# Patient Record
Sex: FEMALE | Race: WHITE | NOT HISPANIC OR LATINO | Employment: OTHER | URBAN - NONMETROPOLITAN AREA
[De-identification: names, ages, dates, MRNs, and addresses within clinical notes are randomized per-mention and may not be internally consistent; named-entity substitution may affect disease eponyms.]

---

## 2017-10-26 ENCOUNTER — DOCTOR'S OFFICE (OUTPATIENT)
Dept: URBAN - NONMETROPOLITAN AREA CLINIC 1 | Facility: CLINIC | Age: 77
Setting detail: OPHTHALMOLOGY
End: 2017-10-26
Payer: COMMERCIAL

## 2017-10-26 DIAGNOSIS — S05.11XA: ICD-10-CM

## 2017-10-26 DIAGNOSIS — H25.13: ICD-10-CM

## 2017-10-26 DIAGNOSIS — H04.123: ICD-10-CM

## 2017-10-26 DIAGNOSIS — H43.813: ICD-10-CM

## 2017-10-26 DIAGNOSIS — H43.811: ICD-10-CM

## 2017-10-26 DIAGNOSIS — H43.812: ICD-10-CM

## 2017-10-26 DIAGNOSIS — S05.12XA: ICD-10-CM

## 2017-10-26 PROCEDURE — 92014 COMPRE OPH EXAM EST PT 1/>: CPT | Performed by: OPHTHALMOLOGY

## 2017-10-26 PROCEDURE — 92225 OPHTHALMOSCOPY EXTENDED INITIAL: CPT | Performed by: OPHTHALMOLOGY

## 2017-10-26 ASSESSMENT — REFRACTION_MANIFEST
OD_VA3: 20/
OS_AXIS: 155
OD_AXIS: 165
OS_SPHERE: +0.25
OD_CYLINDER: -0.25
OD_VA1: 20/
OD_VA1: 20/
OU_VA: 20/
OS_VA1: 20/
OS_VA3: 20/
OD_SPHERE: +0.25
OD_VA2: 20/
OS_VA1: 20/25-2
OU_VA: 20/
OS_VA3: 20/
OS_ADD: +2.50
OD_VA3: 20/
OU_VA: 20/
OD_VA2: 20/
OS_VA3: 20/
OS_VA2: 20/25-2
OD_VA1: 20/25-2
OD_ADD: +2.50
OS_VA1: 20/
OS_CYLINDER: -0.75
OD_VA3: 20/
OS_VA2: 20/
OS_VA2: 20/
OD_VA2: 20/25-2

## 2017-10-26 ASSESSMENT — SPHEQUIV_DERIVED
OS_SPHEQUIV: -0.125
OD_SPHEQUIV: 0.125
OD_SPHEQUIV: 0.75
OS_SPHEQUIV: 0.25

## 2017-10-26 ASSESSMENT — CONFRONTATIONAL VISUAL FIELD TEST (CVF)
OD_FINDINGS: FULL
OS_FINDINGS: FULL

## 2017-10-26 ASSESSMENT — REFRACTION_CURRENTRX
OS_OVR_VA: 20/
OD_CYLINDER: -0.50
OD_SPHERE: 0.00
OS_OVR_VA: 20/
OD_OVR_VA: 20/
OS_AXIS: 155
OD_OVR_VA: 20/
OS_VPRISM_DIRECTION: PROGS
OS_OVR_VA: 20/
OD_ADD: +2.50
OD_AXIS: 170
OS_SPHERE: +0.25
OS_ADD: +2.50
OD_OVR_VA: 20/
OS_CYLINDER: -0.75
OD_VPRISM_DIRECTION: PROGS

## 2017-10-26 ASSESSMENT — REFRACTION_AUTOREFRACTION
OS_CYLINDER: -0.50
OD_AXIS: 163
OD_SPHERE: +1.00
OS_SPHERE: +0.50
OD_CYLINDER: -0.50
OS_AXIS: 155

## 2017-10-26 ASSESSMENT — SUPERFICIAL PUNCTATE KERATITIS (SPK)
OS_SPK: 1+
OD_SPK: 1+

## 2017-10-26 ASSESSMENT — VISUAL ACUITY
OS_BCVA: 20/100-1
OD_BCVA: 20/40

## 2018-01-29 ENCOUNTER — DOCTOR'S OFFICE (OUTPATIENT)
Dept: URBAN - NONMETROPOLITAN AREA CLINIC 1 | Facility: CLINIC | Age: 78
Setting detail: OPHTHALMOLOGY
End: 2018-01-29
Payer: COMMERCIAL

## 2018-01-29 DIAGNOSIS — H04.123: ICD-10-CM

## 2018-01-29 DIAGNOSIS — H43.812: ICD-10-CM

## 2018-01-29 DIAGNOSIS — H43.811: ICD-10-CM

## 2018-01-29 DIAGNOSIS — H25.13: ICD-10-CM

## 2018-01-29 DIAGNOSIS — H43.813: ICD-10-CM

## 2018-01-29 DIAGNOSIS — H53.123: ICD-10-CM

## 2018-01-29 PROCEDURE — 92083 EXTENDED VISUAL FIELD XM: CPT | Performed by: OPHTHALMOLOGY

## 2018-01-29 PROCEDURE — 92226 OPHTHALMOSCOPY EXT SUBSEQUENT: CPT | Performed by: OPHTHALMOLOGY

## 2018-01-29 PROCEDURE — 92014 COMPRE OPH EXAM EST PT 1/>: CPT | Performed by: OPHTHALMOLOGY

## 2018-01-29 PROCEDURE — 92134 CPTRZ OPH DX IMG PST SGM RTA: CPT | Performed by: OPHTHALMOLOGY

## 2018-01-29 ASSESSMENT — SPHEQUIV_DERIVED
OD_SPHEQUIV: 0.75
OS_SPHEQUIV: 0.25
OD_SPHEQUIV: 0.125
OS_SPHEQUIV: -0.125

## 2018-01-29 ASSESSMENT — REFRACTION_MANIFEST
OD_VA3: 20/
OS_VA1: 20/
OD_VA2: 20/25-2
OS_VA2: 20/
OU_VA: 20/
OD_CYLINDER: -0.25
OD_VA1: 20/
OU_VA: 20/
OS_VA3: 20/
OS_ADD: +2.50
OS_AXIS: 155
OD_VA1: 20/
OS_VA2: 20/25-2
OD_VA2: 20/
OD_VA2: 20/
OS_VA3: 20/
OD_AXIS: 165
OD_ADD: +2.50
OD_VA3: 20/
OD_VA3: 20/
OS_CYLINDER: -0.75
OS_VA1: 20/
OS_VA1: 20/25-2
OS_VA2: 20/
OU_VA: 20/
OS_SPHERE: +0.25
OD_VA1: 20/25-2
OD_SPHERE: +0.25
OS_VA3: 20/

## 2018-01-29 ASSESSMENT — REFRACTION_CURRENTRX
OS_OVR_VA: 20/
OD_ADD: +2.50
OD_VPRISM_DIRECTION: PROGS
OD_OVR_VA: 20/
OS_AXIS: 155
OS_SPHERE: +0.25
OD_OVR_VA: 20/
OS_OVR_VA: 20/
OD_SPHERE: 0.00
OD_OVR_VA: 20/
OD_AXIS: 170
OS_ADD: +2.50
OS_OVR_VA: 20/
OS_CYLINDER: -0.75
OD_CYLINDER: -0.50
OS_VPRISM_DIRECTION: PROGS

## 2018-01-29 ASSESSMENT — VISUAL ACUITY
OS_BCVA: 20/150+1
OD_BCVA: 20/40-1

## 2018-01-29 ASSESSMENT — CONFRONTATIONAL VISUAL FIELD TEST (CVF)
OS_FINDINGS: FULL
OD_FINDINGS: FULL

## 2018-01-29 ASSESSMENT — REFRACTION_AUTOREFRACTION
OS_SPHERE: +0.50
OS_CYLINDER: -0.50
OS_AXIS: 155
OD_SPHERE: +1.00
OD_AXIS: 163
OD_CYLINDER: -0.50

## 2018-01-29 ASSESSMENT — SUPERFICIAL PUNCTATE KERATITIS (SPK)
OD_SPK: 1+
OS_SPK: 1+

## 2018-02-28 ENCOUNTER — DOCTOR'S OFFICE (OUTPATIENT)
Dept: URBAN - NONMETROPOLITAN AREA CLINIC 1 | Facility: CLINIC | Age: 78
Setting detail: OPHTHALMOLOGY
End: 2018-02-28
Payer: COMMERCIAL

## 2018-02-28 DIAGNOSIS — H52.223: ICD-10-CM

## 2018-02-28 DIAGNOSIS — H52.4: ICD-10-CM

## 2018-02-28 PROCEDURE — 92015 DETERMINE REFRACTIVE STATE: CPT | Performed by: OPTOMETRIST

## 2018-02-28 ASSESSMENT — REFRACTION_OUTSIDERX
OS_VA3: 20/
OS_VA1: 20/30
OS_CYLINDER: -0.75
OS_SPHERE: PLANO
OD_AXIS: 165
OS_ADD: +2.50
OU_VA: 20/
OS_VA2: 20/30
OD_SPHERE: -0.75
OD_CYLINDER: -0.25
OS_AXIS: 155
OD_VA3: 20/
OD_ADD: +2.50
OD_VA1: 20/60-2
OD_VA2: 20/60-2

## 2018-02-28 ASSESSMENT — REFRACTION_MANIFEST
OD_VA2: 20/
OD_VA1: 20/
OD_VA3: 20/
OU_VA: 20/
OD_VA3: 20/
OS_VA3: 20/
OS_VA2: 20/
OU_VA: 20/
OS_VA1: 20/
OS_VA2: 20/
OD_VA1: 20/
OS_VA3: 20/
OS_VA1: 20/
OD_VA2: 20/

## 2018-02-28 ASSESSMENT — REFRACTION_CURRENTRX
OD_ADD: +2.50
OD_OVR_VA: 20/
OS_OVR_VA: 20/
OD_CYLINDER: -0.25
OS_OVR_VA: 20/
OD_OVR_VA: 20/
OS_VPRISM_DIRECTION: PROGS
OS_AXIS: 151
OD_SPHERE: +0.25
OS_SPHERE: +0.25
OD_VPRISM_DIRECTION: PROGS
OD_OVR_VA: 20/
OS_CYLINDER: -0.75
OD_AXIS: 166
OS_OVR_VA: 20/
OS_ADD: +2.50

## 2018-02-28 ASSESSMENT — SPHEQUIV_DERIVED: OS_SPHEQUIV: -0.25

## 2018-02-28 ASSESSMENT — VISUAL ACUITY
OS_BCVA: 20/100
OD_BCVA: 20/40-2

## 2018-02-28 ASSESSMENT — REFRACTION_AUTOREFRACTION
OD_CYLINDER: TARGET
OS_AXIS: 019
OS_SPHERE: +0.25
OD_SPHERE: NO
OS_CYLINDER: -1.00

## 2018-03-28 ENCOUNTER — DOCTOR'S OFFICE (OUTPATIENT)
Dept: URBAN - NONMETROPOLITAN AREA CLINIC 1 | Facility: CLINIC | Age: 78
Setting detail: OPHTHALMOLOGY
End: 2018-03-28
Payer: COMMERCIAL

## 2018-03-28 DIAGNOSIS — H27.8: ICD-10-CM

## 2018-03-28 DIAGNOSIS — H27.9: ICD-10-CM

## 2018-03-28 DIAGNOSIS — H26.492: ICD-10-CM

## 2018-03-28 DIAGNOSIS — H43.813: ICD-10-CM

## 2018-03-28 DIAGNOSIS — H04.123: ICD-10-CM

## 2018-03-28 DIAGNOSIS — H25.11: ICD-10-CM

## 2018-03-28 PROCEDURE — 92014 COMPRE OPH EXAM EST PT 1/>: CPT | Performed by: OPHTHALMOLOGY

## 2018-03-28 ASSESSMENT — REFRACTION_CURRENTRX
OS_CYLINDER: -0.75
OD_VPRISM_DIRECTION: PROGS
OS_OVR_VA: 20/
OD_ADD: +2.50
OD_OVR_VA: 20/
OD_OVR_VA: 20/
OS_AXIS: 151
OS_ADD: +2.50
OS_OVR_VA: 20/
OD_AXIS: 166
OS_VPRISM_DIRECTION: PROGS
OD_SPHERE: +0.25
OS_SPHERE: +0.25
OS_OVR_VA: 20/
OD_CYLINDER: -0.25
OD_OVR_VA: 20/

## 2018-03-28 ASSESSMENT — VISUAL ACUITY
OS_BCVA: 20/150
OD_BCVA: 20/40-2

## 2018-03-28 ASSESSMENT — REFRACTION_OUTSIDERX
OD_VA2: 20/60-2
OU_VA: 20/
OS_CYLINDER: -0.75
OS_AXIS: 155
OD_VA3: 20/
OS_VA2: 20/30
OD_VA1: 20/60-2
OD_AXIS: 165
OS_ADD: +2.50
OS_VA1: 20/30
OD_ADD: +2.50
OS_VA3: 20/
OS_SPHERE: PLANO
OD_SPHERE: -0.75
OD_CYLINDER: -0.25

## 2018-03-28 ASSESSMENT — REFRACTION_AUTOREFRACTION
OS_AXIS: 152
OS_SPHERE: +0.25
OS_CYLINDER: -0.50
OD_SPHERE: -0.50
OD_CYLINDER: -2.50
OD_AXIS: 10

## 2018-03-28 ASSESSMENT — REFRACTION_MANIFEST
OD_VA1: 20/
OS_VA3: 20/
OU_VA: 20/
OD_VA2: 20/
OS_VA2: 20/
OS_VA3: 20/
OD_VA3: 20/
OD_VA3: 20/
OS_VA1: 20/
OS_VA2: 20/
OS_VA1: 20/
OU_VA: 20/
OD_VA2: 20/
OD_VA1: 20/

## 2018-03-28 ASSESSMENT — SUPERFICIAL PUNCTATE KERATITIS (SPK)
OS_SPK: 1+
OD_SPK: 1+

## 2018-03-28 ASSESSMENT — SPHEQUIV_DERIVED
OD_SPHEQUIV: -1.75
OS_SPHEQUIV: 0

## 2018-03-28 ASSESSMENT — CONFRONTATIONAL VISUAL FIELD TEST (CVF)
OS_FINDINGS: FULL
OD_FINDINGS: FULL

## 2018-04-10 ENCOUNTER — AMBUL SURGICAL CARE (OUTPATIENT)
Dept: URBAN - NONMETROPOLITAN AREA SURGERY 1 | Facility: SURGERY | Age: 78
Setting detail: OPHTHALMOLOGY
End: 2018-04-10
Payer: COMMERCIAL

## 2018-04-10 DIAGNOSIS — H26.492: ICD-10-CM

## 2018-04-10 PROCEDURE — G8907 PT DOC NO EVENTS ON DISCHARG: HCPCS | Performed by: OPHTHALMOLOGY

## 2018-04-10 PROCEDURE — G8918 PT W/O PREOP ORDER IV AB PRO: HCPCS | Performed by: OPHTHALMOLOGY

## 2018-04-10 PROCEDURE — 66821 AFTER CATARACT LASER SURGERY: CPT | Performed by: OPHTHALMOLOGY

## 2018-04-24 ENCOUNTER — DOCTOR'S OFFICE (OUTPATIENT)
Dept: URBAN - NONMETROPOLITAN AREA CLINIC 1 | Facility: CLINIC | Age: 78
Setting detail: OPHTHALMOLOGY
End: 2018-04-24
Payer: COMMERCIAL

## 2018-04-24 DIAGNOSIS — H25.11: ICD-10-CM

## 2018-04-24 PROCEDURE — 92136 OPHTHALMIC BIOMETRY: CPT | Performed by: OPHTHALMOLOGY

## 2018-07-13 ENCOUNTER — DOCTOR'S OFFICE (OUTPATIENT)
Dept: URBAN - NONMETROPOLITAN AREA CLINIC 1 | Facility: CLINIC | Age: 78
Setting detail: OPHTHALMOLOGY
End: 2018-07-13
Payer: COMMERCIAL

## 2018-07-13 DIAGNOSIS — H25.11: ICD-10-CM

## 2018-07-13 DIAGNOSIS — L25.9: ICD-10-CM

## 2018-07-13 DIAGNOSIS — H27.9: ICD-10-CM

## 2018-07-13 DIAGNOSIS — H27.8: ICD-10-CM

## 2018-07-13 PROCEDURE — 92012 INTRM OPH EXAM EST PATIENT: CPT | Performed by: OPHTHALMOLOGY

## 2018-07-13 ASSESSMENT — CONFRONTATIONAL VISUAL FIELD TEST (CVF)
OD_FINDINGS: FULL
OS_FINDINGS: FULL

## 2018-07-13 ASSESSMENT — LID EXAM ASSESSMENTS
OD_COMMENTS: CONTACT DERMATITIS
OS_COMMENTS: CONTACT DERMATITIS

## 2018-07-13 ASSESSMENT — SUPERFICIAL PUNCTATE KERATITIS (SPK)
OD_SPK: 1+
OS_SPK: 1+

## 2018-07-19 ASSESSMENT — VISUAL ACUITY
OS_BCVA: 20/100
OD_BCVA: 20/20

## 2018-07-19 ASSESSMENT — REFRACTION_OUTSIDERX
OS_AXIS: 155
OS_SPHERE: PLANO
OD_AXIS: 165
OS_ADD: +2.50
OD_SPHERE: -0.75
OU_VA: 20/
OD_VA2: 20/60-2
OS_VA1: 20/30
OS_VA3: 20/
OS_CYLINDER: -0.75
OD_VA1: 20/60-2
OD_CYLINDER: -0.25
OD_ADD: +2.50
OS_VA2: 20/30
OD_VA3: 20/

## 2018-07-19 ASSESSMENT — REFRACTION_MANIFEST
OS_VA1: 20/
OS_VA3: 20/
OD_VA2: 20/
OS_VA1: 20/
OU_VA: 20/
OD_VA2: 20/
OS_VA2: 20/
OS_VA3: 20/
OS_VA2: 20/
OD_VA3: 20/
OD_VA1: 20/
OD_VA3: 20/
OD_VA1: 20/
OU_VA: 20/

## 2018-07-19 ASSESSMENT — SPHEQUIV_DERIVED
OD_SPHEQUIV: -1.75
OS_SPHEQUIV: 0

## 2018-07-19 ASSESSMENT — REFRACTION_CURRENTRX
OD_VPRISM_DIRECTION: PROGS
OD_ADD: +2.50
OD_OVR_VA: 20/
OS_AXIS: 151
OS_VPRISM_DIRECTION: PROGS
OD_SPHERE: +0.25
OS_OVR_VA: 20/
OD_AXIS: 166
OD_CYLINDER: -0.25
OS_OVR_VA: 20/
OS_SPHERE: +0.25
OD_OVR_VA: 20/
OS_ADD: +2.50
OS_CYLINDER: -0.75
OD_OVR_VA: 20/
OS_OVR_VA: 20/

## 2018-07-19 ASSESSMENT — REFRACTION_AUTOREFRACTION
OD_AXIS: 10
OS_AXIS: 152
OS_CYLINDER: -0.50
OD_CYLINDER: -2.50
OD_SPHERE: -0.50
OS_SPHERE: +0.25

## 2018-08-02 ENCOUNTER — AMBUL SURGICAL CARE (OUTPATIENT)
Dept: URBAN - NONMETROPOLITAN AREA SURGERY 1 | Facility: SURGERY | Age: 78
Setting detail: OPHTHALMOLOGY
End: 2018-08-02
Payer: COMMERCIAL

## 2018-08-02 DIAGNOSIS — H25.041: ICD-10-CM

## 2018-08-02 DIAGNOSIS — H25.11: ICD-10-CM

## 2018-08-02 PROCEDURE — G8918 PT W/O PREOP ORDER IV AB PRO: HCPCS | Performed by: OPHTHALMOLOGY

## 2018-08-02 PROCEDURE — 66984 XCAPSL CTRC RMVL W/O ECP: CPT | Performed by: OPHTHALMOLOGY

## 2018-08-02 PROCEDURE — G8907 PT DOC NO EVENTS ON DISCHARG: HCPCS | Performed by: OPHTHALMOLOGY

## 2018-08-03 ENCOUNTER — DOCTOR'S OFFICE (OUTPATIENT)
Dept: URBAN - NONMETROPOLITAN AREA CLINIC 1 | Facility: CLINIC | Age: 78
Setting detail: OPHTHALMOLOGY
End: 2018-08-03
Payer: COMMERCIAL

## 2018-08-03 ENCOUNTER — RX ONLY (RX ONLY)
Age: 78
End: 2018-08-03

## 2018-08-03 DIAGNOSIS — H27.9: ICD-10-CM

## 2018-08-03 DIAGNOSIS — H27.8: ICD-10-CM

## 2018-08-03 PROBLEM — H25.11 CATARACT NUCLEAR SCLEROSIS AGE RELATED; RIGHT EYE: Status: RESOLVED | Noted: 2018-03-28 | Resolved: 2018-08-03

## 2018-08-03 PROCEDURE — 99024 POSTOP FOLLOW-UP VISIT: CPT | Performed by: PHYSICIAN ASSISTANT

## 2018-08-03 ASSESSMENT — LID EXAM ASSESSMENTS
OD_COMMENTS: CONTACT DERMATITIS
OS_COMMENTS: CONTACT DERMATITIS

## 2018-08-03 ASSESSMENT — SUPERFICIAL PUNCTATE KERATITIS (SPK)
OD_SPK: ABSENT
OS_SPK: 1+

## 2018-08-06 ASSESSMENT — REFRACTION_CURRENTRX
OD_OVR_VA: 20/
OS_OVR_VA: 20/
OD_SPHERE: +0.25
OD_ADD: +2.50
OD_VPRISM_DIRECTION: PROGS
OS_OVR_VA: 20/
OS_AXIS: 151
OS_VPRISM_DIRECTION: PROGS
OD_OVR_VA: 20/
OD_CYLINDER: -0.25
OD_AXIS: 166
OD_OVR_VA: 20/
OS_CYLINDER: -0.75
OS_SPHERE: +0.25
OS_ADD: +2.50
OS_OVR_VA: 20/

## 2018-08-06 ASSESSMENT — REFRACTION_AUTOREFRACTION
OS_AXIS: 145
OD_SPHERE: +1.00
OS_SPHERE: +0.50
OS_CYLINDER: -0.75
OD_CYLINDER: -1.00
OD_AXIS: 001

## 2018-08-06 ASSESSMENT — REFRACTION_MANIFEST
OS_VA2: 20/
OS_VA1: 20/
OD_VA1: 20/
OS_VA3: 20/
OU_VA: 20/
OS_VA3: 20/
OD_VA2: 20/
OD_VA1: 20/
OS_VA1: 20/
OD_VA3: 20/
OU_VA: 20/
OD_VA3: 20/
OD_VA2: 20/
OS_VA2: 20/

## 2018-08-06 ASSESSMENT — REFRACTION_OUTSIDERX
OD_ADD: +2.50
OU_VA: 20/
OS_VA1: 20/30
OS_VA3: 20/
OD_VA3: 20/
OD_VA1: 20/60-2
OS_CYLINDER: -0.75
OD_CYLINDER: -0.25
OD_AXIS: 165
OD_SPHERE: -0.75
OS_VA2: 20/30
OS_AXIS: 155
OS_SPHERE: PLANO
OS_ADD: +2.50
OD_VA2: 20/60-2

## 2018-08-06 ASSESSMENT — SPHEQUIV_DERIVED
OD_SPHEQUIV: 0.5
OS_SPHEQUIV: 0.125

## 2018-08-06 ASSESSMENT — VISUAL ACUITY
OD_BCVA: 20/20
OS_BCVA: 20/25-2

## 2018-08-29 ENCOUNTER — DOCTOR'S OFFICE (OUTPATIENT)
Dept: URBAN - NONMETROPOLITAN AREA CLINIC 1 | Facility: CLINIC | Age: 78
Setting detail: OPHTHALMOLOGY
End: 2018-08-29
Payer: COMMERCIAL

## 2018-08-29 DIAGNOSIS — H27.9: ICD-10-CM

## 2018-08-29 DIAGNOSIS — H27.8: ICD-10-CM

## 2018-08-29 PROCEDURE — 99024 POSTOP FOLLOW-UP VISIT: CPT | Performed by: PHYSICIAN ASSISTANT

## 2018-08-29 ASSESSMENT — SUPERFICIAL PUNCTATE KERATITIS (SPK)
OS_SPK: 1+
OD_SPK: ABSENT

## 2018-08-29 ASSESSMENT — LID EXAM ASSESSMENTS
OS_COMMENTS: CONTACT DERMATITIS
OD_COMMENTS: CONTACT DERMATITIS

## 2018-08-30 ASSESSMENT — VISUAL ACUITY
OS_BCVA: 20/25-2
OD_BCVA: 20/20-1

## 2018-08-30 ASSESSMENT — REFRACTION_MANIFEST
OS_VA2: 20/
OS_VA3: 20/
OU_VA: 20/
OS_VA3: 20/
OD_CYLINDER: -0.25
OD_VA2: 20/60-2
OS_ADD: +2.50
OS_VA1: 20/30
OD_AXIS: 165
OD_VA2: 20/
OD_VA1: 20/60-2
OS_VA2: 20/30
OS_AXIS: 155
OD_VA3: 20/
OD_VA1: 20/
OU_VA: 20/
OD_ADD: +2.50
OD_VA3: 20/
OS_SPHERE: PLANO
OD_SPHERE: -0.75
OS_VA1: 20/
OS_CYLINDER: -0.75

## 2018-08-30 ASSESSMENT — REFRACTION_CURRENTRX
OD_ADD: +2.50
OD_OVR_VA: 20/
OS_SPHERE: +0.25
OS_VPRISM_DIRECTION: PROGS
OD_AXIS: 166
OS_OVR_VA: 20/
OD_OVR_VA: 20/
OS_ADD: +2.50
OD_OVR_VA: 20/
OD_VPRISM_DIRECTION: PROGS
OS_CYLINDER: -0.75
OS_OVR_VA: 20/
OD_SPHERE: +0.25
OD_CYLINDER: -0.25
OS_OVR_VA: 20/
OS_AXIS: 151

## 2018-08-30 ASSESSMENT — REFRACTION_AUTOREFRACTION
OS_CYLINDER: -0.75
OD_AXIS: 175
OD_SPHERE: +0.75
OS_SPHERE: +0.50
OS_AXIS: 125
OD_CYLINDER: -1.25

## 2018-08-30 ASSESSMENT — SPHEQUIV_DERIVED
OD_SPHEQUIV: -0.875
OD_SPHEQUIV: 0.125
OS_SPHEQUIV: 0.125

## 2018-12-18 ENCOUNTER — DOCTOR'S OFFICE (OUTPATIENT)
Dept: URBAN - NONMETROPOLITAN AREA CLINIC 1 | Facility: CLINIC | Age: 78
Setting detail: OPHTHALMOLOGY
End: 2018-12-18
Payer: COMMERCIAL

## 2018-12-18 ENCOUNTER — RX ONLY (RX ONLY)
Age: 78
End: 2018-12-18

## 2018-12-18 DIAGNOSIS — L25.9: ICD-10-CM

## 2018-12-18 DIAGNOSIS — H04.123: ICD-10-CM

## 2018-12-18 PROCEDURE — 99214 OFFICE O/P EST MOD 30 MIN: CPT | Performed by: PHYSICIAN ASSISTANT

## 2018-12-18 ASSESSMENT — SUPERFICIAL PUNCTATE KERATITIS (SPK)
OD_SPK: 1+
OS_SPK: T 1+

## 2018-12-18 ASSESSMENT — LID EXAM ASSESSMENTS
OD_BLEPHARITIS: ABSENT
OS_BLEPHARITIS: ABSENT

## 2018-12-18 ASSESSMENT — DRY EYES - PHYSICIAN NOTES
OD_GENERALCOMMENTS: INFERIOR HALF
OS_GENERALCOMMENTS: INFERIOR HALF

## 2018-12-19 ASSESSMENT — REFRACTION_CURRENTRX
OS_VPRISM_DIRECTION: PROGS
OD_OVR_VA: 20/
OS_SPHERE: +0.25
OS_OVR_VA: 20/
OS_AXIS: 151
OD_AXIS: 166
OS_OVR_VA: 20/
OD_OVR_VA: 20/
OS_OVR_VA: 20/
OD_CYLINDER: -0.25
OD_ADD: +2.50
OD_OVR_VA: 20/
OD_SPHERE: +0.25
OD_VPRISM_DIRECTION: PROGS
OS_ADD: +2.50
OS_CYLINDER: -0.75

## 2018-12-19 ASSESSMENT — REFRACTION_MANIFEST
OD_AXIS: 165
OS_SPHERE: PLANO
OS_VA2: 20/
OD_VA3: 20/
OU_VA: 20/
OS_AXIS: 155
OS_VA2: 20/30
OD_VA3: 20/
OD_ADD: +2.50
OS_VA3: 20/
OS_VA1: 20/30
OD_VA2: 20/
OD_VA2: 20/60-2
OU_VA: 20/
OD_VA1: 20/
OS_VA3: 20/
OS_CYLINDER: -0.75
OS_VA1: 20/
OD_VA1: 20/60-2
OD_SPHERE: -0.75
OD_CYLINDER: -0.25
OS_ADD: +2.50

## 2018-12-19 ASSESSMENT — REFRACTION_AUTOREFRACTION
OS_SPHERE: +0.50
OS_AXIS: 125
OD_CYLINDER: -1.25
OS_CYLINDER: -0.75
OD_SPHERE: +0.75
OD_AXIS: 175

## 2018-12-19 ASSESSMENT — SPHEQUIV_DERIVED
OS_SPHEQUIV: 0.125
OD_SPHEQUIV: 0.125
OD_SPHEQUIV: -0.875

## 2018-12-19 ASSESSMENT — VISUAL ACUITY
OD_BCVA: 20/20-1
OS_BCVA: 20/25

## 2019-01-15 ENCOUNTER — RX ONLY (RX ONLY)
Age: 79
End: 2019-01-15

## 2019-01-15 ENCOUNTER — DOCTOR'S OFFICE (OUTPATIENT)
Dept: URBAN - NONMETROPOLITAN AREA CLINIC 1 | Facility: CLINIC | Age: 79
Setting detail: OPHTHALMOLOGY
End: 2019-01-15
Payer: COMMERCIAL

## 2019-01-15 DIAGNOSIS — H04.122: ICD-10-CM

## 2019-01-15 DIAGNOSIS — H04.123: ICD-10-CM

## 2019-01-15 DIAGNOSIS — H04.121: ICD-10-CM

## 2019-01-15 DIAGNOSIS — L25.9: ICD-10-CM

## 2019-01-15 PROBLEM — H53.122: Status: ACTIVE | Noted: 2018-01-29

## 2019-01-15 PROBLEM — H43.813 POST VITREOUS DETACHMENT; RIGHT EYE, LEFT EYE: Status: ACTIVE | Noted: 2017-10-26

## 2019-01-15 PROBLEM — T15.02XD FB CORNEA; LEFT EYE SUBSEQUENT ENCOUNTER: Status: ACTIVE | Noted: 2017-10-26

## 2019-01-15 PROBLEM — H27.9 PSEUDOPHAKIA ; BOTH EYES: Status: ACTIVE | Noted: 2018-07-13

## 2019-01-15 PROBLEM — H43.812 POST VITREOUS DETACHMENT; RIGHT EYE, LEFT EYE: Status: ACTIVE | Noted: 2017-10-26

## 2019-01-15 PROBLEM — H53.121: Status: ACTIVE | Noted: 2018-01-29

## 2019-01-15 PROBLEM — H27.8 PSEUDOPHAKIA ; BOTH EYES: Status: ACTIVE | Noted: 2018-07-13

## 2019-01-15 PROBLEM — S05.11XA CONTUSION EYEBALL AND ORBITAL TISSUES; RIGHT EYE INITIAL ENCOUNTER, LEFT EYE INITIAL ENCOUNTER: Status: ACTIVE | Noted: 2017-10-26

## 2019-01-15 PROBLEM — S05.12XA CONTUSION EYEBALL AND ORBITAL TISSUES; RIGHT EYE INITIAL ENCOUNTER, LEFT EYE INITIAL ENCOUNTER: Status: ACTIVE | Noted: 2017-10-26

## 2019-01-15 PROBLEM — H26.492 POSTERIOR CAPSULE OPACIFIED; LEFT EYE: Status: ACTIVE | Noted: 2018-03-28

## 2019-01-15 PROBLEM — H43.811 POST VITREOUS DETACHMENT; RIGHT EYE, LEFT EYE: Status: ACTIVE | Noted: 2017-10-26

## 2019-01-15 PROCEDURE — 83861 MICROFLUID ANALY TEARS: CPT | Performed by: PHYSICIAN ASSISTANT

## 2019-01-15 PROCEDURE — 99214 OFFICE O/P EST MOD 30 MIN: CPT | Performed by: PHYSICIAN ASSISTANT

## 2019-01-15 ASSESSMENT — LID EXAM ASSESSMENTS
OS_BLEPHARITIS: ABSENT
OD_BLEPHARITIS: ABSENT

## 2019-01-15 ASSESSMENT — SUPERFICIAL PUNCTATE KERATITIS (SPK)
OS_SPK: T
OD_SPK: ABSENT

## 2019-01-17 ASSESSMENT — SPHEQUIV_DERIVED
OD_SPHEQUIV: 0.125
OS_SPHEQUIV: 0.125
OD_SPHEQUIV: -0.875

## 2019-01-17 ASSESSMENT — REFRACTION_CURRENTRX
OS_CYLINDER: -0.75
OD_OVR_VA: 20/
OS_AXIS: 151
OD_VPRISM_DIRECTION: PROGS
OD_AXIS: 166
OD_OVR_VA: 20/
OS_OVR_VA: 20/
OS_SPHERE: +0.25
OS_OVR_VA: 20/
OS_VPRISM_DIRECTION: PROGS
OD_ADD: +2.50
OD_SPHERE: +0.25
OD_CYLINDER: -0.25
OS_OVR_VA: 20/
OS_ADD: +2.50
OD_OVR_VA: 20/

## 2019-01-17 ASSESSMENT — REFRACTION_MANIFEST
OU_VA: 20/
OD_VA1: 20/60-2
OU_VA: 20/
OD_VA2: 20/60-2
OD_VA3: 20/
OS_VA1: 20/
OS_CYLINDER: -0.75
OS_SPHERE: PLANO
OD_VA3: 20/
OD_AXIS: 165
OS_VA3: 20/
OD_VA1: 20/
OD_ADD: +2.50
OS_VA2: 20/
OS_ADD: +2.50
OS_VA2: 20/30
OD_VA2: 20/
OD_CYLINDER: -0.25
OS_AXIS: 155
OS_VA3: 20/
OS_VA1: 20/30
OD_SPHERE: -0.75

## 2019-01-17 ASSESSMENT — REFRACTION_AUTOREFRACTION
OD_AXIS: 175
OS_AXIS: 125
OD_CYLINDER: -1.25
OD_SPHERE: +0.75
OS_SPHERE: +0.50
OS_CYLINDER: -0.75

## 2019-01-17 ASSESSMENT — VISUAL ACUITY
OD_BCVA: 20/20-1
OS_BCVA: 20/25

## 2020-08-03 ENCOUNTER — HOSPITAL ENCOUNTER (EMERGENCY)
Facility: HOSPITAL | Age: 80
Discharge: HOME | End: 2020-08-03
Attending: EMERGENCY MEDICINE
Payer: COMMERCIAL

## 2020-08-03 ENCOUNTER — APPOINTMENT (EMERGENCY)
Dept: CT IMAGING | Facility: HOSPITAL | Age: 80
End: 2020-08-03
Payer: COMMERCIAL

## 2020-08-03 ENCOUNTER — APPOINTMENT (EMERGENCY)
Dept: RADIOLOGY | Facility: HOSPITAL | Age: 80
End: 2020-08-03
Payer: COMMERCIAL

## 2020-08-03 VITALS
SYSTOLIC BLOOD PRESSURE: 181 MMHG | RESPIRATION RATE: 12 BRPM | TEMPERATURE: 97.8 F | OXYGEN SATURATION: 96 % | HEART RATE: 85 BPM | DIASTOLIC BLOOD PRESSURE: 79 MMHG | WEIGHT: 205.69 LBS

## 2020-08-03 DIAGNOSIS — S20.211A CONTUSION OF RIGHT CHEST WALL, INITIAL ENCOUNTER: Primary | ICD-10-CM

## 2020-08-03 DIAGNOSIS — I10 HYPERTENSION: ICD-10-CM
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
## 2020-08-03 LAB
ALBUMIN SERPL BCP-MCNC: 3.8 G/DL (ref 3.5–5)
ALP SERPL-CCNC: 75 U/L (ref 46–116)
ALT SERPL W P-5'-P-CCNC: 29 U/L (ref 12–78)
ANION GAP SERPL CALCULATED.3IONS-SCNC: 10 MMOL/L (ref 4–13)
AST SERPL W P-5'-P-CCNC: 23 U/L (ref 5–45)
BASOPHILS # BLD AUTO: 0.08 THOUSANDS/ΜL (ref 0–0.1)
BASOPHILS NFR BLD AUTO: 1 % (ref 0–1)
BILIRUB SERPL-MCNC: 0.7 MG/DL (ref 0.2–1)
BUN SERPL-MCNC: 15 MG/DL (ref 5–25)
CALCIUM SERPL-MCNC: 8.9 MG/DL (ref 8.3–10.1)
CHLORIDE SERPL-SCNC: 106 MMOL/L (ref 100–108)
CO2 SERPL-SCNC: 26 MMOL/L (ref 21–32)
CREAT SERPL-MCNC: 1.14 MG/DL (ref 0.6–1.3)
EOSINOPHIL # BLD AUTO: 0.2 THOUSAND/ΜL (ref 0–0.61)
EOSINOPHIL NFR BLD AUTO: 3 % (ref 0–6)
ERYTHROCYTE [DISTWIDTH] IN BLOOD BY AUTOMATED COUNT: 13 % (ref 11.6–15.1)
GFR SERPL CREATININE-BSD FRML MDRD: 46 ML/MIN/1.73SQ M
GLUCOSE SERPL-MCNC: 111 MG/DL (ref 65–140)
HCT VFR BLD AUTO: 41.2 % (ref 34.8–46.1)
HGB BLD-MCNC: 14.2 G/DL (ref 11.5–15.4)
IMM GRANULOCYTES # BLD AUTO: 0.07 THOUSAND/UL (ref 0–0.2)
IMM GRANULOCYTES NFR BLD AUTO: 1 % (ref 0–2)
LYMPHOCYTES # BLD AUTO: 1.64 THOUSANDS/ΜL (ref 0.6–4.47)
LYMPHOCYTES NFR BLD AUTO: 21 % (ref 14–44)
MAGNESIUM SERPL-MCNC: 2.3 MG/DL (ref 1.6–2.6)
MCH RBC QN AUTO: 32.6 PG (ref 26.8–34.3)
MCHC RBC AUTO-ENTMCNC: 34.5 G/DL (ref 31.4–37.4)
MCV RBC AUTO: 95 FL (ref 82–98)
MONOCYTES # BLD AUTO: 0.53 THOUSAND/ΜL (ref 0.17–1.22)
MONOCYTES NFR BLD AUTO: 7 % (ref 4–12)
NEUTROPHILS # BLD AUTO: 5.17 THOUSANDS/ΜL (ref 1.85–7.62)
NEUTS SEG NFR BLD AUTO: 67 % (ref 43–75)
NRBC BLD AUTO-RTO: 0 /100 WBCS
PLATELET # BLD AUTO: 247 THOUSANDS/UL (ref 149–390)
PMV BLD AUTO: 10 FL (ref 8.9–12.7)
POTASSIUM SERPL-SCNC: 3.4 MMOL/L (ref 3.5–5.3)
PROT SERPL-MCNC: 7.2 G/DL (ref 6.4–8.2)
RBC # BLD AUTO: 4.36 MILLION/UL (ref 3.81–5.12)
SODIUM SERPL-SCNC: 142 MMOL/L (ref 136–145)
WBC # BLD AUTO: 7.69 THOUSAND/UL (ref 4.31–10.16)

## 2020-08-03 PROCEDURE — 71045 X-RAY EXAM CHEST 1 VIEW: CPT

## 2020-08-03 PROCEDURE — G1004 CDSM NDSC: HCPCS

## 2020-08-03 PROCEDURE — 72125 CT NECK SPINE W/O DYE: CPT

## 2020-08-03 PROCEDURE — 70450 CT HEAD/BRAIN W/O DYE: CPT

## 2020-08-03 PROCEDURE — 99285 EMERGENCY DEPT VISIT HI MDM: CPT

## 2020-08-03 PROCEDURE — 71260 CT THORAX DX C+: CPT

## 2020-08-03 PROCEDURE — 85025 COMPLETE CBC W/AUTO DIFF WBC: CPT | Performed by: EMERGENCY MEDICINE

## 2020-08-03 PROCEDURE — 99285 EMERGENCY DEPT VISIT HI MDM: CPT | Performed by: EMERGENCY MEDICINE

## 2020-08-03 PROCEDURE — 80053 COMPREHEN METABOLIC PANEL: CPT | Performed by: EMERGENCY MEDICINE

## 2020-08-03 PROCEDURE — 74177 CT ABD & PELVIS W/CONTRAST: CPT

## 2020-08-03 PROCEDURE — 36415 COLL VENOUS BLD VENIPUNCTURE: CPT | Performed by: EMERGENCY MEDICINE

## 2020-08-03 PROCEDURE — 83735 ASSAY OF MAGNESIUM: CPT | Performed by: EMERGENCY MEDICINE

## 2020-08-03 RX ORDER — ACETAMINOPHEN 325 MG/1
650 TABLET ORAL ONCE
Status: COMPLETED | OUTPATIENT
Start: 2020-08-03 | End: 2020-08-03

## 2020-08-03 RX ORDER — LIDOCAINE 40 MG/G
CREAM TOPICAL
Qty: 30 G | Refills: 0 | Status: SHIPPED | OUTPATIENT
Start: 2020-08-03

## 2020-08-03 RX ORDER — LIDOCAINE 50 MG/G
2 PATCH TOPICAL ONCE
Status: DISCONTINUED | OUTPATIENT
Start: 2020-08-03 | End: 2020-08-03 | Stop reason: HOSPADM

## 2020-08-03 RX ORDER — ACETAMINOPHEN 325 MG/1
650 TABLET ORAL EVERY 6 HOURS PRN
Qty: 1 BOTTLE | Refills: 0 | Status: SHIPPED | OUTPATIENT
Start: 2020-08-03

## 2020-08-03 RX ORDER — CITALOPRAM 10 MG/1
10 TABLET ORAL DAILY
COMMUNITY

## 2020-08-03 RX ADMIN — ACETAMINOPHEN 650 MG: 325 TABLET, FILM COATED ORAL at 18:45

## 2020-08-03 RX ADMIN — LIDOCAINE 5% 2 PATCH: 700 PATCH TOPICAL at 18:49

## 2020-08-03 RX ADMIN — IOHEXOL 100 ML: 350 INJECTION, SOLUTION INTRAVENOUS at 17:13

## 2020-08-03 NOTE — DISCHARGE INSTRUCTIONS
Incidental left thyroid 1 8 cm solid nodule identified on this CT  According to guidelines published in the February 2015 white paper on incidental thyroid nodules in the Journal of the [employer] of Radiology [facility]), Because the nodule(s) are   greater than 1 5 cm in size, further characterization with thyroid ultrasound is recommended

## 2020-08-03 NOTE — TRAUMA DOCUMENTATION
Patient ambulating to bathroom with minimal assistance  Steady gait observed, patient however does complain of worsening pain with movement  Dr Marianela Menendez made aware
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
